# Patient Record
Sex: FEMALE | Race: BLACK OR AFRICAN AMERICAN
[De-identification: names, ages, dates, MRNs, and addresses within clinical notes are randomized per-mention and may not be internally consistent; named-entity substitution may affect disease eponyms.]

---

## 2017-06-01 ENCOUNTER — HOSPITAL ENCOUNTER (EMERGENCY)
Dept: HOSPITAL 62 - ER | Age: 67
Discharge: HOME | End: 2017-06-01
Payer: MEDICARE

## 2017-06-01 VITALS — SYSTOLIC BLOOD PRESSURE: 164 MMHG | DIASTOLIC BLOOD PRESSURE: 73 MMHG

## 2017-06-01 DIAGNOSIS — I10: ICD-10-CM

## 2017-06-01 DIAGNOSIS — Z91.013: ICD-10-CM
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
## 2017-06-01 DIAGNOSIS — Z96.653: ICD-10-CM

## 2017-06-01 DIAGNOSIS — S49.91XA: Primary | ICD-10-CM

## 2017-06-01 DIAGNOSIS — S80.12XA: ICD-10-CM
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
## 2017-06-01 PROCEDURE — 96372 THER/PROPH/DIAG INJ SC/IM: CPT

## 2017-06-01 PROCEDURE — 99283 EMERGENCY DEPT VISIT LOW MDM: CPT

## 2017-06-01 PROCEDURE — 73030 X-RAY EXAM OF SHOULDER: CPT

## 2017-06-01 PROCEDURE — 73590 X-RAY EXAM OF LOWER LEG: CPT

## 2017-06-01 NOTE — ER DOCUMENT REPORT
ED Fall





- General


Chief Complaint: Arm Pain


Stated Complaint: FALL/SHOULDER


Time Seen by Provider: 06/01/17 20:58


Mode of Arrival: Wheelchair


Information source: Patient


Notes: 


66-year-old female presents to ED for right shoulder and left lower leg pain.  

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 

landing on a piece of cement in her yard.  He has a history of rheumatoid 

arthritis and bilateral knee replacement.


TRAVEL OUTSIDE OF THE [place] IN LAST 30 DAYS: No





- HPI


Occurred: This afternoon


Where: Home, Outdoors


Context: Tripped


Associated symptoms: None


Location of injury/pain: Shoulder, Lower extremity


Quality of pain: Achy, Sharp, Throbbing


Severity: Mild


Pain Level: 2





- Related data


Allergies/Adverse Reactions: 


 





shellfish derived Allergy (Verified 11/29/16 10:20)


 











Past Medical History





- General


Information source: Patient





- Social History


Smoking Status: Never Smoker


Cigarette use (# per day): No


Chew tobacco use (# tins/day): No


Smoking Education Provided: No


Frequency of alcohol use: None


Drug Abuse: None


Lives with: Family


Family History: Reviewed & Not Pertinent


Patient has suicidal ideation: No


Patient has homicidal ideation: No





- Past Medical History


Cardiac Medical History: Reports: Hx Hypertension


Pulmonary Medical History: Reports: None


EENT Medical History: Reports: None


Neurological Medical History: Reports: None


Endocrine Medical History: Reports: None


Renal/ Medical History: Reports: None


Malignancy Medical History: Reports: None


GI Medical History: Reports: Hx Gastroesophageal Reflux Disease, Hx Colonoscopy

, Hx Endoscopy


Musculoskeltal Medical History: Reports Hx Arthritis - rheumatoid


Skin Medical History: Reports None


Psychiatric Medical History: Reports: None


Traumatic Medical History: Reports: None


Infectious Medical History: Reports: None


Past Surgical History: Reports: Hx Appendectomy, Hx Breast Surgery - Biopsy, Hx 

Cholecystectomy, Hx Orthopedic Surgery - BL knee replacement





- Immunizations


Hx Diphtheria, Pertussis, Tetanus Vaccination: Yes





Review of Systems





- Review of Systems


Constitutional: No symptoms reported


EENT: No symptoms reported


Cardiovascular: No symptoms reported


Respiratory: No symptoms reported


Gastrointestinal: No symptoms reported


Genitourinary: No symptoms reported


Female Genitourinary: No symptoms reported


Musculoskeletal: Other - Right shoulder and left lower leg pain


Skin: Change in color, Other - Ecchymosis and very small abrasion to the left 

lower leg


Hematologic/Lymphatic: No symptoms reported


Neurological/Psychological: No symptoms reported





Physical Exam





- Vital signs


Vitals: 


 











Temp Pulse Resp BP Pulse Ox


 


 98.2 F   72   16   164/73 H  94 


 


 06/01/17 19:23  06/01/17 19:23  06/01/17 19:23  06/01/17 19:23  06/01/17 19:23











Interpretation: Normal





- General


General appearance: Appears well, Alert





- HEENT


Head: Normocephalic, Atraumatic


Eyes: Normal


Pupils: PERRL





- Respiratory


Respiratory status: No respiratory distress


Chest status: Nontender


Breath sounds: Normal


Chest palpation: Normal





- Cardiovascular


Rhythm: Regular


Heart sounds: Normal auscultation


Murmur: No





- Abdominal


Inspection: Normal


Distension: No distension


Bowel sounds: Normal


Tenderness: Nontender


Organomegaly: No organomegaly





- Back


Back: Normal, Nontender





- Extremities


General upper extremity: Normal color, Normal temperature


General lower extremity: Normal ROM, Normal temperature, Normal weight bearing.

  No: Bobby's sign


Shoulder: Tender, Limited ROM - The pain.  No: Normal, Nontender, Abrasion, 

Deformity, Dislocation, Ecchymosis, Instability, Laceration, Other


Arm: Normal, Nontender


Elbow: Normal, Nontender


Forearm: Normal, Nontender


Wrist: Normal, Nontender


Hand: Normal, Nontender


Hip: Normal, Nontender


Thigh: Normal, Nontender


Knee: Normal, Nontender


Calf: Tender, Abrasion - I will, Ecchymosis.  No: Normal, Nontender, Deformity, 

Instability, Laceration, Unable to bear weight, Other


Ankle: Normal, Nontender


Foot: Normal, Nontender





- Neurological


Neuro grossly intact: Yes


Cognition: Normal


Orientation: AAOx4


Aram Coma Scale Eye Opening: Spontaneous


[place] Coma Scale Verbal: Oriented


Aram Coma Scale Motor: Obeys Commands


[place] Coma Scale Total: 15


Speech: Normal


Motor strength normal: LUE, RUE, LLE, RLE


Sensory: Normal





- Psychological


Associated symptoms: Normal affect, Normal mood





- Skin


Skin Temperature: Warm


Skin Moisture: Dry


Skin Color: Normal





Course





- Re-evaluation


Re-evalutation: 





06/01/17 22:02


Discussed chest x-rays with patient and family.  Patient instructed on need to 

move shoulder and to continue her exercises as she has rheumatoid arthritis.  

Patient was treated with Toradol and Percocet in the emergency room for her 

pain and discharged home with a prescription for Percocet.  Patient takes Aleve 

for her arthritis as well as other rheumatoid arthritis medications.  Patient 

instructed on use of ice anti-inflammatory medication and exercise for her pain.





- Vital Signs


Vital signs: 


 











Temp Pulse Resp BP Pulse Ox


 


 98.2 F   72   16   164/73 H  94 


 


 06/01/17 19:23  06/01/17 19:23  06/01/17 19:23  06/01/17 19:23  06/01/17 19:23














- Diagnostic Test


Radiology reviewed: Image reviewed, Reports reviewed





Discharge





- Discharge


Clinical Impression: 


Fall


Qualifiers:


 Encounter type: initial encounter Qualified Code(s): W19.XXXA - Unspecified 

fall, initial encounter





Right shoulder injury


Qualifiers:


 Encounter type: initial encounter Qualified Code(s): S49.91XA - Unspecified 

injury of right shoulder and upper arm, initial encounter





Contusion of left lower leg, initial encounter


Qualifiers:


 Encounter type: initial encounter Qualified Code(s): S80.12XA - Contusion of 

left lower leg, initial encounter





Additional Instructions: 


CONTUSION:


    Your injury has resulted in a contusion -- a crushing of the deep tissues.  

No injury to important structures was detected during the physician's exam.  

Contusions vary in the amount of pain they cause, and in the length of time 

required for healing.  Typically, the area will become bruised, and will remain 

painful to touch for two or three weeks.  However, most patients are back to 

working and playing within a few days.


     After the initial period of rest and cold-packs, your symptoms (together 

with the doctor's recommendations) will determine how rapidly you can get back 

to full activity.  Usually this means "do what feels okay, but don't do things 

that hurt."


     If re-examination was recommended, it's important to follow up as 

instructed.  Call the doctor or return any time if pain increases, if swelling 

becomes severe, if you develop numbness or weakness in an injured extremity, or 

if any other alarming symptoms occur.








ABRASIONS:


     An abrasion is a scraping injury of the skin.  Some scarring may result.  

The seriousness of an abrasion is not always obvious at first. Hidden tissue 

damage may be present and infection may occur despite proper care.


     Complete healing may take from ten days to as long as a month. The healing 

time depends on the depth of the abrasion, and on the amount of crushing of 

underlying tissues from the injury.


     Keep the wound and dressing clean.  Do not shower or bathe the area until 

okayed by the doctor.  If the dressing gets wet, remove it and blot the wound 

dry, then reapply a clean dressing.  Dressings should be changed every day.  

Sunscreen should be used for six months after the skin is healed.


     If any signs of infection occur (swelling, redness, increasing tenderness, 

red streaks, profuse purulent drainage from the abrasion, tender lumps in the 

armpit or groin above the abrasion, or fever), see the doctor immediately.





Shoulder Injury





     You have injured your shoulder.  This usually results from stretching or 

tearing of the tendons during trauma.  Time and protection are required in 

order to heal properly.  Many injuries are quite disabling, and should be taken 

seriously.


     Initial treatment includes cold packs and a sling to rest the shoulder.  

The physician has assessed the seriousness of your injury, and has outlined a 

treatment plan.  Understand that this treatment may change, depending on how 

you progress.


     If a re-examination was recommended, it is important that you follow up as 

instructed.  Some shoulder injuries (such as partial tear of the rotator cuff) 

are only suspected after you've failed to improve.


Call us if there's severe pain, numbness, or loss of function.





ICE PACKS:


     Apply ice packs frequently against the painful area.  Many different 

schedules are recommended, such as "20 minutes on, 20 minutes off" or "one hour 

ice, two hours rest."  If you need to work, you may need to go longer between 

ice treatments.  You should plan to have the area ice packed AT LEAST one 

fourth of the time.


     The ice should be applied over the wrap, tape, or splint, or over a layer 

of cloth -- not directly against the skin.  Some ice bags have a built-in cloth 

and can be put directly on the skin.





WARM PACKS:


     After approximately two days, apply gentle heat (such as a heating pad or 

hot water bottle) for about 20 to 30 minutes about every two hours -- at least 

four times daily.  Warmth and elevation will help you make a more rapid recovery

, and will ease the pain considerably.


     Do not use HOT heat, and never apply heat for longer than 30 minutes.  The 

continuous heat can invisibly damage skin and muscles -- even when no burn is 

seen on the surface.  Damaged muscles can make you MORE sore.





Anti-Inflammatory Medication





     You have received a prescription for an antiinflammatory agent. This is an 

excellent, safe drug for pain control.  In addition, it has potent 

antiinflammatory effects which are beneficial, especially in the treatment of 

injuries, arthritis, or tendonitis.


     It's best to take this medicine with food.  Persons with ulcer disease or 

allergy to aspirin should notify their physician of this before taking this 

drug.


     Take the medication exactly as prescribed.  Don't take additional doses 

unless instructed to do so by your doctor.  If you develop wheezing, shortness 

of breath, hives, faintness, stomach pain, vomiting, or dark black stools, 

return for re-evaluation at once.





Oral Narcotic Medication





     You have been given a prescription for pain control.  This medication is a 

narcotic.  It's best taken with food, as nausea can result if taken on an empty 

stomach.


     Don't operate machinery or drive within six hours of taking this 

medication.  Do not combine this medicine with alcohol, or with any medication 

which can cause sedation (such as cold tablets or sleeping pills) unless you 

get permission from the physician.


     Narcotics tend to cause constipation.  If possible, drink plenty of fluids 

and eat a diet high in fiber and fruits.





FOLLOW-UP CARE:


If you have been referred to a physician for follow-up care, call the physician

s office for an appointment as you were instructed or within the next two days.

  If you experience worsening or a significant change in your symptoms, notify 

the physician immediately or return to the Emergency Department at any time for 

re-evaluation.








Call your orthopedic doctor in the morning and reveal a follow-up visit for 

your shoulder injury.


Prescriptions: 


Oxycodone HCl/Acetaminophen [Percocet 5-325 mg Tablet] 1 tab PO Q6HP PRN #15 

tablet


 PRN Reason: 


Forms:  Elevated Blood Pressure


Referrals: 


HEENA SEGOVIA MD [Primary Care Provider] - Follow up as needed

## 2017-06-01 NOTE — RADIOLOGY REPORT (SQ)
EXAM DESCRIPTION:  TIBIA FIBULA LEFT



COMPLETED DATE/TIME:  6/1/2017 8:54 pm



REASON FOR STUDY:  Fall, left leg pain and swelling



COMPARISON:  None.



NUMBER OF VIEWS:  Two views.



TECHNIQUE:  Two radiographic images acquired of the left tibia and fibula to include the knee and ank
le in at least one projection.



LIMITATIONS:  None.



FINDINGS:  MINERALIZATION: Normal.

BONES: No acute fracture or dislocation.  No worrisome bone lesions.

SOFT TISSUES: No obvious swelling or foreign body.

OTHER: Patient is status post left total knee replacement.



IMPRESSION:  Status post left total knee replacement.  NO RADIOGRAPHIC EVIDENCE OF ACUTE INJURY.



TECHNICAL DOCUMENTATION:  JOB ID:  [id number]

 2011 [employer]- All Rights Reserved

## 2017-06-01 NOTE — RADIOLOGY REPORT (SQ)
EXAM DESCRIPTION:  SHOULDER RIGHT 2 OR MORE VIEWS



COMPLETED DATE/TIME:  6/1/2017 8:54 pm



REASON FOR STUDY:  Fall, rt shoulder pain



COMPARISON:  None.



NUMBER OF VIEWS:  Three views.



TECHNIQUE:  Internal rotation, external rotation, and Y view images acquired of the right shoulder.



LIMITATIONS:  None.



FINDINGS:  MINERALIZATION: Normal.

BONES: No acute fracture or dislocation.  No worrisome bone lesions.

JOINTS: No dislocation.

VISUALIZED LUNGS AND RIBS: No pneumothorax.  No rib fracture.

SOFT TISSUES: No radiopaque foreign body.

OTHER: No other significant finding.



IMPRESSION:  NEGATIVE STUDY OF THE RIGHT SHOULDER. NO RADIOGRAPHIC EVIDENCE OF ACUTE INJURY.



TECHNICAL DOCUMENTATION:  JOB ID:  [id number]

 2011 [employer]- All Rights Reserved

## 2017-06-23 ENCOUNTER — HOSPITAL ENCOUNTER (OUTPATIENT)
Dept: HOSPITAL 62 - RAD | Age: 67
End: 2017-06-23
Attending: ORTHOPAEDIC SURGERY
Payer: MEDICARE
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
## 2017-06-23 DIAGNOSIS — S42.254A: ICD-10-CM

## 2017-06-23 DIAGNOSIS — M25.511: Primary | ICD-10-CM

## 2017-06-23 NOTE — RADIOLOGY REPORT (SQ)
EXAM DESCRIPTION:  MRI RT UPPER JOINT WITHOUT



COMPLETED DATE/TIME:  6/23/2017 10:46 am



REASON FOR STUDY:  RIGHT SHOULDER PAIN M25.511  PAIN IN RIGHT SHOULDER



COMPARISON:  6/1/2017



TECHNIQUE:  Right shoulder images acquired and stored on PACS. Multiplanar imaging to include fat sen
sitive sequences such as T1, water sensitive sequences such as FST2/STIR, cartilage sensitive sequenc
es such as FSPD/gradient-echo sequences.



LIMITATIONS:  None.



FINDINGS:  BONE MARROW AND CORTEX: A hairline nondisplaced fracture through the right proximal humeru
s is present, extending through the greater tuberosity, this is best shown on sagittal T1 images 6 th
rough 9, coronal image 12, and axial image 9.

JOINT OR BURSAL EFFUSION: Small joint effusion.  Trace fluid in the subacromial/subdeltoid bursa.

GLENO-HUMERAL ARTICULATION: Normal alignment. No subluxation. No cystic change. No osteophytes.  Mini
mal chondromalacia.

ACROMION AND AC JOINT: Type 2, with mild acromioclavicular joint hypertrophy.  No down-sloping or dis
amarjit spur.  Mild narrowing of the subacromial space.

ROTATOR CUFF AND INTERVAL: There is mild undersurface high signal in the distal supraspinatus and inf
raspinatus tendons from mild tendinopathy.  No full-thickness tear.  subscapularis intact.

No rotator interval tear. No rotator interval thickening to suggest adhesive capsulitis.

 LABRUM  AND BICEPS LABRAL COMPLEX: Intact. No labral tear. Intra-articular long-head biceps tendon n
ormal.  Distal biceps in normal location in bicipital groove.

REMAINDER OF LABRUM AND IGHL : No gross tear or paralabral cyst formation.  Labral evaluation is less
 than optimal without joint distention. No thickening of IGHL to suggest adhesive capsulitis.

PERIARTICULAR AND ADJACENT SOFT TISSUES: No masses or abnormal nodes.

OTHER: No other significant finding.



IMPRESSION:  Nondisplaced fracture of the right proximal humerus along the greater tuberosity, with m
ild marrow edema.

Mild undersurface tendinopathy of the supra and infraspinatus tendons



TECHNICAL DOCUMENTATION:  JOB ID:  [id number]

 2011 [employer]- All Rights Reserved

## 2017-12-12 ENCOUNTER — HOSPITAL ENCOUNTER (OUTPATIENT)
Dept: HOSPITAL 62 - SP | Age: 67
End: 2017-12-12
Attending: FAMILY MEDICINE
Payer: MEDICARE

## 2017-12-12 DIAGNOSIS — H34.8322: Primary | ICD-10-CM

## 2017-12-12 PROCEDURE — 93880 EXTRACRANIAL BILAT STUDY: CPT

## 2017-12-12 NOTE — RADIOLOGY REPORT (SQ)
EXAM DESCRIPTION:  CAROTID DOPPLER



COMPLETED DATE/TIME:  12/12/2017 9:48 am



REASON FOR STUDY:  RETINAL VEIN OCCLUSION H34.8322  TRIBUTARY (BRANCH) RETINAL VEIN OCCLUSION, LEFT E
YE



COMPARISON:  None.



TECHNIQUE:  Grayscale ultrasound, Doppler velocity and spectra, and color Doppler images acquired of 
the extra-cranial carotid and vertebral arteries. Images stored on PACS.



LIMITATIONS:  None.



FINDINGS:  RIGHT CAROTID

CCA Velocities: Within normal limits.

ICA Velocities

 Peak systolic 0.70 m/s.

 End diastolic 0.20 m/s.

Proximal ICA/CCA peak systolic ratio 0.9.

Spectra normal. No significant plaque.  Carotid bifurcation deep in the right neck.

LEFT CAROTID

CCA Velocities: Within normal limits.

ICA Velocities

 Peak systolic 0.71 m/s.

 End diastolic 0.24 m/s.

Proximal ICA/CCA peak systolic ratio 1.1.

Spectra normal. No significant plaque.

VERTEBRAL ARTERIES: Antegrade flow.  Normal waveforms.

SUBCLAVIAN ARTERIES: Not evaluated

OTHER: No other significant finding.



IMPRESSION:  NO HEMODYNAMICALLY SIGNIFICANT STENOSIS.



COMMENT:  Quality ID #[id number]:  Velocity criteria are extrapolated from the diameter data as defined by t
he Society of Radiologists in Ultrasound Consensus Conference. Radiology 2003: 229; 340-346.



TECHNICAL DOCUMENTATION:  JOB ID:  [id number]

 2011 [employer]- All Rights Reserved

## 2019-09-03 ENCOUNTER — HOSPITAL ENCOUNTER (EMERGENCY)
Dept: HOSPITAL 62 - ER | Age: 69
Discharge: HOME | End: 2019-09-03
Payer: MEDICARE

## 2019-09-03 VITALS — DIASTOLIC BLOOD PRESSURE: 83 MMHG | SYSTOLIC BLOOD PRESSURE: 146 MMHG

## 2019-09-03 DIAGNOSIS — M54.5: ICD-10-CM

## 2019-09-03 DIAGNOSIS — I10: ICD-10-CM

## 2019-09-03 DIAGNOSIS — Z90.49: ICD-10-CM

## 2019-09-03 DIAGNOSIS — M54.32: Primary | ICD-10-CM

## 2019-09-03 DIAGNOSIS — Z96.653: ICD-10-CM

## 2019-09-03 PROCEDURE — 99283 EMERGENCY DEPT VISIT LOW MDM: CPT

## 2019-09-03 RX ADMIN — KETOROLAC TROMETHAMINE ONE: 30 INJECTION, SOLUTION INTRAMUSCULAR at 15:18

## 2019-09-03 RX ADMIN — LIDOCAINE ONE: 50 PATCH CUTANEOUS at 15:18

## 2019-09-03 RX ADMIN — DEXAMETHASONE SODIUM PHOSPHATE ONE: 10 INJECTION INTRAMUSCULAR; INTRAVENOUS at 15:18

## 2019-09-03 RX ADMIN — KETOROLAC TROMETHAMINE ONE MG: 30 INJECTION, SOLUTION INTRAMUSCULAR at 15:25

## 2019-09-03 RX ADMIN — LIDOCAINE ONE PATCH: 50 PATCH CUTANEOUS at 15:25

## 2019-09-03 RX ADMIN — DEXAMETHASONE SODIUM PHOSPHATE ONE MG: 10 INJECTION INTRAMUSCULAR; INTRAVENOUS at 15:25

## 2019-09-03 NOTE — ER DOCUMENT REPORT
HPI





- HPI


Time Seen by Provider: 09/03/19 14:00


Pain Level: 5


Context: 





Patient is a 68-year-old female with a history of hypertension, sciatica and 

arthritis who presents to the emergency department with a chief complaint of 

left lower back pain.  Patient states she has had left lower back pain over the 

past week that seems to radiate down the left leg.  Patient reports this pain is

a sharp burning type pain.  Patient states that radiates down her leg is located

on the posterior aspect of the thigh and then wraps around into her shin.  

Patient reports that walking and position changes make the pain worse.  Patient 

states she did go to the chiropractor last Monday without relief.  Patient 

states she has been taking Aleve and gabapentin.  Patient denies a fall or 

recent injury.  Patient denies numbness or tingling to the groin or rectal area.

 Patient states that time she will have left foot numbness but is able to 

ambulate.  Patient denies loss of bowel or bladder.





- CONSTITUTIONAL


Constitutional: DENIES: Fever, Chills





- REPRODUCTIVE


Reproductive: DENIES: Pregnant:





- MUSCULOSKELETAL


Musculoskeletal: REPORTS: Extremity pain - L hip, L leg





Past Medical History





- General


Information source: Patient





- Social History


Smoking Status: Never Smoker


Frequency of alcohol use: None


Drug Abuse: None


Lives with: Spouse/Significant other


Family History: Reviewed & Not Pertinent


Patient has suicidal ideation: No


Patient has homicidal ideation: No





- Past Medical History


Cardiac Medical History: Reports: Hx Hypertension


Pulmonary Medical History: Reports: None


EENT Medical History: Reports: None


Neurological Medical History: Reports: None


Endocrine Medical History: Reports: None


Renal/ Medical History: Reports: None.  Denies: Hx Peritoneal Dialysis


Malignancy Medical History: Reports: None


GI Medical History: Reports: Hx Gastroesophageal Reflux Disease, Hx Colonoscopy,

Hx Endoscopy


Musculoskeletal Medical History: Reports Hx Arthritis - rheumatoid


Skin Medical History: Reports None


Psychiatric Medical History: Reports: None


Traumatic Medical History: Reports: None


Infectious Medical History: Reports: None


Past Surgical History: Reports: Hx Appendectomy, Hx Breast Surgery - Biopsy, Hx 

Cholecystectomy, Hx Orthopedic Surgery - BL knee replacement





- Immunizations


Hx Diphtheria, Pertussis, Tetanus Vaccination: Yes





Vertical Provider Document





- CONSTITUTIONAL


Agree With Documented VS: Yes


Exam Limitations: No Limitations


General Appearance: No Apparent Distress





- INFECTION CONTROL


TRAVEL OUTSIDE OF THE [place] IN LAST 30 DAYS: No





- HEENT


HEENT: Atraumatic, Normocephalic, PERRLA





- RESPIRATORY


Respiratory: Breath Sounds Normal, No Respiratory Distress





- CARDIOVASCULAR


Cardiovascular: Regular Rate, Regular Rhythm





- GI/ABDOMEN


Gastrointestinal: Abdomen Soft, Abdomen Non-Tender, Normal Bowel Sounds





- BACK


Back: Normal Inspection


Notes: 





Tenderness with palpation to the left latissimus dorsi muscle and thoracolumbar 

fasia, and left gluteus delfino.  Patient has positive sensation to the left 

extremity and has full range of motion.  There is no edema, erythema or 

ecchymosis noted to the lower back.  There is no cervical, thoracic or lumbar 

midline tenderness.





- NEURO


Level of Consciousness: Awake, Alert, Appropriate





- DERM


Integumentary: Warm, Dry, No Rash





Course





- Re-evaluation


Re-evalutation: 





09/03/19 14:52


We will treat the patient with Lidoderm patch, dose of steroids and anti-

inflammatory.  I did inform the patient to continue using her gabapentin, anti-

inflammatories, and to keep her appointment with physical therapy as this may 

help her.  Patient to continue using cool compresses or if it feels better to 

use warm compresses.  Patient given strict return precautions or to return if 

symptoms worsen.


09/03/19 17:33


This is late entry but prior to discharge patient states her pain was completely

gone.  Patient reports that she feels much relief.  Patient to continue with the

regimen plan that we have discussed and gone over.  Patient verbalized 

understanding and stable for discharge.





- Vital Signs


Vital signs: 


                                        











Temp Pulse Resp BP Pulse Ox


 


 98.4 F   98   20   181/88 H  95 


 


 09/03/19 13:51  09/03/19 13:51  09/03/19 13:51  09/03/19 13:51  09/03/19 13:51














Discharge





- Discharge


Clinical Impression: 


Sciatica


Qualifiers:


 Laterality: left Qualified Code(s): M54.32 - Sciatica, left side





Condition: Stable


Disposition: HOME, SELF-CARE


Instructions:  Low Back Pain ([facility])


Additional Instructions: 


Today you are seen in the emergency department for left lower back pain.  You 

have stated that this pain radiates into the left lower extremity and feels like

your previous history of sciatica.  We have given you a dose of steroids and a 

shot of Toradol which is an anti-inflammatory.  Please continue to use cool 

compresses to the site.  Continue use the gabapentin which is a type of 

medication used for nerve pain as previously prescribed by your physician.  Keep

your appointment with physical therapy as this can help with your sciatica.  

Until he go to physical therapy please rest.  Please return to the emergency 

department if you have any increasing numbness, localized weakness to the foot 

or ankle or if your pain does not respond to the interventions.





I have also given you lidocaine patches.  This helps with your pain.  Most 

common reaction is a localized reaction or rash from the patch.  This does not 

make you drowsy.




















Sciatica





     Your symptoms suggest "sciatica."  The pain of sciatica typically radiates 

down the leg.  Numbness in the foot or calf may also occur. Sciatica is caused 

by irritation of the sciatic nerve or its branches. The irritation can be due to

a herniated disk in the spine, swelling and inflammation in the muscles 

surrounding the sciatic nerve, or direct injury of the nerve itself.


     Most cases of sciatica will resolve with medical treatment. Bed rest is 

usually recommended initially.  Surgery is only necessary when the condition 

will not improve with rest and antiinflammatory medication.  Muscle relaxers are

often given if muscle soreness is present.


     A CAT scan of the back may be performed if a herniated disk is suspected.


     Re-examination is necessary if you develop increasing numbness, localized 

weakness in the foot or ankle, or if the pain does not respond to rest.





Prescriptions: 


Lidocaine [Lidoderm 5% (700 mg) Transdermal Patch] 1 patch TP DAILY #10 

adh..patch


Methocarbamol [Robaxin 500 mg Tablet] 1,000 mg PO TID #15 tablet


Referrals: 


HEENA SEGOVIA MD [Primary Care Provider] - Follow up as needed

## 2019-11-19 LAB
APPEARANCE UR: (no result)
APTT PPP: YELLOW S
BILIRUB UR QL STRIP: NEGATIVE
ERYTHROCYTE [DISTWIDTH] IN BLOOD BY AUTOMATED COUNT: 14.5 % (ref 11.5–14)
GLUCOSE UR STRIP-MCNC: NEGATIVE MG/DL
HCT VFR BLD CALC: 39.9 % (ref 36–47)
HGB BLD-MCNC: 13.2 G/DL (ref 12–15.5)
KETONES UR STRIP-MCNC: NEGATIVE MG/DL
MCH RBC QN AUTO: 27.4 PG (ref 27–33.4)
MCHC RBC AUTO-ENTMCNC: 33.2 G/DL (ref 32–36)
MCV RBC AUTO: 83 FL (ref 80–97)
NITRITE UR QL STRIP: NEGATIVE
PH UR STRIP: 5 [PH] (ref 5–9)
PLATELET # BLD: 355 10^3/UL (ref 150–450)
PROT UR STRIP-MCNC: NEGATIVE MG/DL
RBC # BLD AUTO: 4.83 10^6/UL (ref 3.72–5.28)
SP GR UR STRIP: 1.02
UROBILINOGEN UR-MCNC: NEGATIVE MG/DL (ref ?–2)
WBC # BLD AUTO: 5.8 10^3/UL (ref 4–10.5)

## 2019-11-25 ENCOUNTER — HOSPITAL ENCOUNTER (OUTPATIENT)
Dept: HOSPITAL 62 - OROUT | Age: 69
Discharge: HOME | End: 2019-11-25
Attending: OBSTETRICS & GYNECOLOGY
Payer: MEDICARE

## 2019-11-25 VITALS — SYSTOLIC BLOOD PRESSURE: 154 MMHG | DIASTOLIC BLOOD PRESSURE: 89 MMHG

## 2019-11-25 DIAGNOSIS — Z79.899: ICD-10-CM

## 2019-11-25 DIAGNOSIS — N84.0: ICD-10-CM

## 2019-11-25 DIAGNOSIS — N87.1: Primary | ICD-10-CM

## 2019-11-25 DIAGNOSIS — I10: ICD-10-CM

## 2019-11-25 PROCEDURE — 88307 TISSUE EXAM BY PATHOLOGIST: CPT

## 2019-11-25 PROCEDURE — 36415 COLL VENOUS BLD VENIPUNCTURE: CPT

## 2019-11-25 PROCEDURE — 58558 HYSTEROSCOPY BIOPSY: CPT

## 2019-11-25 PROCEDURE — 00952 ANES VAG PX HYSTSC&/HSG: CPT

## 2019-11-25 PROCEDURE — 57520 CONIZATION OF CERVIX: CPT

## 2019-11-25 PROCEDURE — 85027 COMPLETE CBC AUTOMATED: CPT

## 2019-11-25 PROCEDURE — 81001 URINALYSIS AUTO W/SCOPE: CPT

## 2019-11-25 PROCEDURE — 93010 ELECTROCARDIOGRAM REPORT: CPT

## 2019-11-25 PROCEDURE — 88305 TISSUE EXAM BY PATHOLOGIST: CPT

## 2019-11-25 PROCEDURE — 93005 ELECTROCARDIOGRAM TRACING: CPT

## 2019-11-25 NOTE — OPERATIVE REPORT
Operative Report


DATE OF SURGERY: 11/25/19


PREOPERATIVE DIAGNOSIS: PMB abnormal Pap


POSTOPERATIVE DIAGNOSIS: Same plus endometrial polyp


OPERATION: Hysteroscopy D&C: Cold knife cone


SURGEON: TEO SANDERS


ANESTHESIA: Moderate Sedation


TISSUE REMOVED OR ALTERED: Endometrial tissue and polyp cervical tissue and ECC


COMPLICATIONS: 





None


ESTIMATED BLOOD LOSS: Less than 10 cc


INTRAOPERATIVE FINDINGS: Endometrial polyp


PROCEDURE: 


Patient was placed in a dorsal lithotomy position prepped draped sterile 

fashion.  The cervix was prolapse to the introitus was grasped with a single-

tooth tenaculum sounded to a depth of 11 cm.  Cervix was dilated to admit the 

hysteroscope hysteroscopy was performed with findings of atrophic endometrium 

and endometrial polyp.  Sharp curettage was then performed and repeat 

hysteroscopy showed the polyp had been removed.  No other overt abnormalities 

were noted.  The cone was then performed by placing hemostatic sutures 2-0 

Vicryl at 3 and 9:00.  Cervix was injected with dilute solution of Pitressin and

Xylocaine.  Wounds were made at 369 and 12 and these were connected with sharp 

dissection and the cone was removed intact.  ECC was done.  The cone bed was 

then cauterized hemostasis was noted pledget of Gelfoam soaked in [place] was 

placed in the office and the hemostatic sutures previously placed were plicated 

in the midline.  The second through the night was removed.  The procedure 

terminated.  Patient was taken to recovery room in good condition.